# Patient Record
Sex: FEMALE | Employment: UNEMPLOYED | URBAN - METROPOLITAN AREA
[De-identification: names, ages, dates, MRNs, and addresses within clinical notes are randomized per-mention and may not be internally consistent; named-entity substitution may affect disease eponyms.]

---

## 2017-02-08 ENCOUNTER — TELEPHONE (OUTPATIENT)
Dept: PSYCHIATRY | Facility: CLINIC | Age: 27
End: 2017-02-08

## 2017-02-08 NOTE — TELEPHONE ENCOUNTER
On 02/06/2017 the patient signed an CALVIN authorizing the release of complete medical records from [facility] to [facility] for the purpose of continuing care. On 02/08/2017 I sent this CALVIN to Medical Records via the [email or web address] system.  Miranda Nielson LPN

## 2017-07-21 ENCOUNTER — HOSPITAL ENCOUNTER (OUTPATIENT)
Dept: LAB | Facility: CLINIC | Age: 27
Discharge: HOME | End: 2017-07-21
Attending: PSYCHIATRY & NEUROLOGY | Admitting: PSYCHIATRY & NEUROLOGY
Payer: COMMERCIAL

## 2017-07-21 DIAGNOSIS — F31.9 BIPOLAR DISORDER, UNSPECIFIED (H): Primary | ICD-10-CM

## 2017-07-21 LAB
ANION GAP SERPL CALCULATED.3IONS-SCNC: 8 MMOL/L (ref 3–14)
BUN SERPL-MCNC: 10 MG/DL (ref 7–30)
CALCIUM SERPL-MCNC: 8.7 MG/DL (ref 8.5–10.1)
CHLORIDE SERPL-SCNC: 109 MMOL/L (ref 94–109)
CHOLEST SERPL-MCNC: 158 MG/DL
CO2 SERPL-SCNC: 23 MMOL/L (ref 20–32)
CREAT SERPL-MCNC: 0.62 MG/DL (ref 0.52–1.04)
GFR SERPL CREATININE-BSD FRML MDRD: NORMAL ML/MIN/1.7M2
GLUCOSE SERPL-MCNC: 96 MG/DL (ref 70–99)
HDLC SERPL-MCNC: 50 MG/DL
LDLC SERPL CALC-MCNC: 61 MG/DL
LITHIUM SERPL-SCNC: 0.9 MMOL/L (ref 0.6–1.2)
NONHDLC SERPL-MCNC: 108 MG/DL
POTASSIUM SERPL-SCNC: 4.2 MMOL/L (ref 3.4–5.3)
SODIUM SERPL-SCNC: 140 MMOL/L (ref 133–144)
T4 SERPL-MCNC: 15.7 UG/DL (ref 4.5–13.9)
TRIGL SERPL-MCNC: 233 MG/DL
TSH SERPL DL<=0.05 MIU/L-ACNC: 2.7 MU/L (ref 0.4–4)

## 2017-07-21 PROCEDURE — 84436 ASSAY OF TOTAL THYROXINE: CPT | Performed by: PSYCHIATRY & NEUROLOGY

## 2017-07-21 PROCEDURE — 80061 LIPID PANEL: CPT | Performed by: PSYCHIATRY & NEUROLOGY

## 2017-07-21 PROCEDURE — 36415 COLL VENOUS BLD VENIPUNCTURE: CPT | Performed by: PSYCHIATRY & NEUROLOGY

## 2017-07-21 PROCEDURE — 80048 BASIC METABOLIC PNL TOTAL CA: CPT | Performed by: PSYCHIATRY & NEUROLOGY

## 2017-07-21 PROCEDURE — 80178 ASSAY OF LITHIUM: CPT | Performed by: PSYCHIATRY & NEUROLOGY

## 2017-07-21 PROCEDURE — 84443 ASSAY THYROID STIM HORMONE: CPT | Performed by: PSYCHIATRY & NEUROLOGY

## 2017-09-03 ENCOUNTER — HEALTH MAINTENANCE LETTER (OUTPATIENT)
Age: 27
End: 2017-09-03

## 2019-11-26 ENCOUNTER — HOSPITAL ENCOUNTER (EMERGENCY)
Facility: CLINIC | Age: 29
Discharge: HOME | End: 2019-11-26
Attending: EMERGENCY MEDICINE | Admitting: EMERGENCY MEDICINE
Payer: COMMERCIAL

## 2019-11-26 VITALS
HEIGHT: 68 IN | TEMPERATURE: 98.1 F | RESPIRATION RATE: 20 BRPM | SYSTOLIC BLOOD PRESSURE: 135 MMHG | BODY MASS INDEX: 34.86 KG/M2 | OXYGEN SATURATION: 94 % | WEIGHT: 230 LBS | DIASTOLIC BLOOD PRESSURE: 85 MMHG | HEART RATE: 114 BPM

## 2019-11-26 DIAGNOSIS — K59.00 CONSTIPATION, UNSPECIFIED CONSTIPATION TYPE: ICD-10-CM

## 2019-11-26 PROCEDURE — 25000132 ZZH RX MED GY IP 250 OP 250 PS 637: Performed by: EMERGENCY MEDICINE

## 2019-11-26 PROCEDURE — 99283 EMERGENCY DEPT VISIT LOW MDM: CPT

## 2019-11-26 PROCEDURE — 25000125 ZZHC RX 250: Performed by: EMERGENCY MEDICINE

## 2019-11-26 RX ORDER — LIDOCAINE HYDROCHLORIDE 20 MG/ML
5 SOLUTION OROPHARYNGEAL ONCE
Status: COMPLETED | OUTPATIENT
Start: 2019-11-26 | End: 2019-11-26

## 2019-11-26 RX ORDER — POLYETHYLENE GLYCOL 3350 17 G/17G
1 POWDER, FOR SOLUTION ORAL DAILY
Qty: 527 G | Refills: 0 | Status: SHIPPED | OUTPATIENT
Start: 2019-11-26 | End: 2019-12-26

## 2019-11-26 RX ADMIN — MAGNESIUM CITRATE 286 ML: 1.75 LIQUID ORAL at 18:31

## 2019-11-26 RX ADMIN — MAGNESIUM CITRATE 286 ML: 1.75 LIQUID ORAL at 18:03

## 2019-11-26 RX ADMIN — LIDOCAINE HYDROCHLORIDE 5 ML: 20 SOLUTION ORAL; TOPICAL at 17:58

## 2019-11-26 ASSESSMENT — ENCOUNTER SYMPTOMS
VOMITING: 0
ABDOMINAL PAIN: 1
FEVER: 0
NAUSEA: 0
DIFFICULTY URINATING: 1
BACK PAIN: 1
CONSTIPATION: 1

## 2019-11-26 ASSESSMENT — MIFFLIN-ST. JEOR: SCORE: 1821.77

## 2019-11-26 NOTE — ED AVS SNAPSHOT
Emergency Department  [address] [facility] [address]  Phone:  [phone]  Fax:  [phone]                                    Kerry Baker   MRN: [id number]    Department:   Emergency Department   Date of Visit:  11/26/2019           After Visit Summary Signature Page    I have received my discharge instructions, and my questions have been answered. I have discussed any challenges I see with this plan with the nurse or doctor.    ..........................................................................................................................................  Patient/Patient Representative Signature      ..........................................................................................................................................  Patient Representative Print Name and Relationship to Patient    ..................................................               ................................................  Date                                   Time    ..........................................................................................................................................  Reviewed by Signature/Title    ...................................................              ..............................................  Date                                               Time          22EPIC Rev 08/18

## 2019-11-26 NOTE — ED PROVIDER NOTES
History     Chief Complaint:  Constipation    HPI  Kerry Baker is a 29 year old female with a history of psychosis who presents to the emergency department today for evaluation of constipation. The patient has been constipated for 5 days and has already tried multiple enemas but with no bowel movements. She also notes that her bowel movement 5 days ago was small and she had to strain to pass this. She has some abdominal and lower back pain with this. The patient also has urinary retention issues as a side effect of her antipsychotic medications and self-caths 5-6 times per day for this. She denies any fever or history of bowel obstructions.       Allergies:  Codeine Camsylate    Medications:    Miralax  Abilify  Asenapine   Doxycycline   Ativan  Inderal  Lithium   Zypreca  Seroquel  Lyrica    Past Medical History:    ADHD   Generalized anxiety disorder  Psychosis  Severe manic bipolar I disorder with psychotic features  Delusions   Asthma    Past Surgical History:    Repair nasal septum    Family History:    The patient's family history includes Anxiety Disorder in her maternal uncle, maternal uncle, mother, sister, and another family member; Depression in her maternal uncle and another family member; Mental Illness in her father; Substance Abuse in her father, maternal grandfather, maternal grandmother, maternal uncle, maternal uncle, and sister.    Social History:  The patient reports that she has been smoking cigarettes. She has a 0.08 pack-year smoking history. She has never used smokeless tobacco. She reports that she does not drink alcohol or use drugs.   PCP: Janina Whitmore  Marital Status: Single [1]      Review of Systems   Constitutional: Negative for fever.   Gastrointestinal: Positive for abdominal pain and constipation. Negative for nausea and vomiting.   Genitourinary: Positive for difficulty urinating.   Musculoskeletal: Positive for back pain.     10 point review of systems performed  "and is negative except as above and in HPI.    Physical Exam     Patient Vitals for the past 24 hrs:   BP Temp Temp src Pulse Resp SpO2 Height Weight   11/26/19 1909 -- -- -- -- 20 -- -- --   11/26/19 1652 135/85 98.1  F (36.7  C) Oral 114 16 94 % 1.727 m (5' 8\") 104.3 kg (230 lb)     Physical Exam  General: Resting on the gurney, appears uncomfortable  Head:  The scalp, face, and head appear normal  Mouth/Throat: Mucus membranes are moist  CV:  Regular rate    Normal S1 and S2  No pathological murmur   Resp:  Breath sounds clear and equal bilaterally    Non-labored, no retractions or accessory muscle use    No coarseness    No wheezing   GI:  Abdomen is soft, no rigidity    Abdomen is slightly distended with mild diffuse tenderness.  No focal tenderness to palpation.  Rectal:  No stool palpable in the rectum  MS:  Normal motor assessment of all extremities.    Good capillary refill noted.  Skin:  No rash or lesions noted.  Neuro:   Speech is normal and fluent. No apparent deficit.  Psych: Awake. Alert.  Normal affect.      Appropriate interactions.      Emergency Department Course     Interventions:  1758 Xylocaine 5 ml PO  1803 pink lady enema 286 mL rectal  1831 pink lady enema 286 mL rectal    Emergency Department Course:  Past medical records, nursing notes, and vitals reviewed.  1738: I performed an exam of the patient and obtained history, as documented above.     1815: I rechecked the patient. Explained findings to patient.    1848: I rechecked the patient.  Findings and plan explained to the Patient and mother. Patient discharged home with instructions regarding supportive care, medications, and reasons to return. The importance of close follow-up was reviewed.     Impression & Plan      Medical Decision Making:  Kerry Baker is a 29 year old female who presents for evaluation for decreased stool output without signs of serious intraabdominal problems. Signs and symptoms are consistent with " constipation. There are no signs at this point for a need for rectal disimpaction.  There are no signs of obstruction nor other intraabdominal catastrophe.   There are no indications for advanced imaging or admission.  I am going to send them home with instructions on medication management of this. They will then start daily stool softener as well once they are more completely cleaned out. Patient is agreeable with this and questions are answered.     Diagnosis:    ICD-10-CM   1. Constipation, unspecified constipation type K59.00       Disposition:   discharged to home    Discharge Medications:     Medication List      Started    polyethylene glycol powder  Commonly known as:  MIRALAX  1 capful, Oral, DAILY            Scribe Disclosure:  I, Heike Harmon, am serving as a scribe at 5:38 PM on 11/26/2019 to document services personally performed by Monet Larsen MD based on my observations and the provider's statements to me.     EMERGENCY DEPARTMENT       Monet Larsen MD  11/26/19 2050

## 2019-11-26 NOTE — ED TRIAGE NOTES
Pt has IBS recent med change to help caused constipation for the last 5 days - has tried 2 bottles of mag citrate, saline enema x 4 and also a mineral oil enema ... pt is so uncomfortable- has made a good effort on her part.